# Patient Record
(demographics unavailable — no encounter records)

---

## 2024-12-19 NOTE — REVIEW OF SYSTEMS
[Negative] : Heme/Lymph [de-identified] : Left foot cyst along the extensor hallucis longus tendon, mobile.  Right hallux and second digit nail discoloration, subungual hematoma. [de-identified] : diminished light touch sensation bilaterally

## 2024-12-19 NOTE — HISTORY OF PRESENT ILLNESS
[FreeTextEntry1] : Pt is a 67 yr old F with a pmHx of osteoporosis, HLD, and a degree of peripheral neuropathy is being seen today for an evaluation of both feet for R hammer toe deformity and a possible cyst on the dorsum of the L foot. Pt reports she developed a blister on the medial aspect of the R 2nd toe around thanksgiving which has since resolved but would like to an assessment by podiatry to determine the exact etiology of the blister as she believes a maybe be do to a hammer toe deformity. If so, she would like to discuss conservative prevention strategies to prevent further deformity. The R hallux nail ecchymosis, pt reports she has had this in past and the nail eventually grows out and returns to normal. This pt doesn't not recall any traumatic events that may have caused this.

## 2024-12-19 NOTE — ASSESSMENT
[Verbal] : Verbal [Written] : Written [Demo] : Demo [Patient] : Patient [Good - alert, interested, motivated] : Good - alert, interested, motivated [Demonstrates independently] : demonstrates independently [Foot Care] : foot care [Skin Care] : skin care [Signs and symptoms of infection] : sign and symptoms of infection [Nutrition] : nutrition [How and When to Call] : how and when to call [Labs and Tests] : labs and tests [Patient responsibility to plan of care] : patient responsibility to plan of care [Stable] : stable [Home] : Home [Ambulatory] : Ambulatory [Not Applicable - Long Term Care/Home Health Agency] : Long Term Care/Home Health Agency: Not Applicable [] : Yes [FreeTextEntry2] : infection prevention foot care appropriate foot wear/toe spacers skin care [FreeTextEntry3] : initial visit  [FreeTextEntry4] : F/U PRN DPM discussed to monitor cyst on the L foot, no surgical intervention needed at this time DPM discussed the R hallux microtrauma as the cause of the nail discoloration.  DPM discussed use of toe spacers to prevent/reduce further hallux lateral deviation into the 2nd toe.

## 2024-12-19 NOTE — PHYSICAL EXAM
[2+] : left 2+ [Ankle Swelling (On Exam)] : not present [Varicose Veins Of Lower Extremities] : not present [] : not present [Skin Ulcer] : no ulcer [de-identified] : A&Ox3, NAD [de-identified] : Right hallux abutting the second toe [de-identified] : Left foot cyst along the extensor hallucis longus tendon, mobile.  Right hallux and second digit nail discoloration, subungual hematoma. [de-identified] : Light touch sensation diminished bilaterally [FreeTextEntry1] : R hallux nail bed- ecchymosis  [de-identified] : pulses palpable bilaterally  Left Dorsalis Pedis 2+ regular  Left Posterior Tibialis: 2+ regular  Right Dorsalis Pedis: 2+ regular  Right Posterior Tibialis: 2+ regular Cap refill <3 secs skin: warm/pigmented Vascular Studies: Not ordered by Dpm [FreeTextEntry7] : Left dorsal foot 1st metatarsal- cyst  [TWNoteComboBox4] : None [TWNoteComboBox5] : No [de-identified] : No [de-identified] : Normal [de-identified] : None [de-identified] : None [de-identified] : None [de-identified] : No [de-identified] : None [de-identified] : No

## 2024-12-19 NOTE — PLAN
[FreeTextEntry1] : Patient examined and evaluated this time.  Discussed possible etiology of her symptoms and all questions answered to satisfaction.  Discussed monitoring left foot dorsal cyst for any changes, will consider intervention if changes are noted.  Patient advised regarding possible microtrauma of the right hallux and second toenail, patient to monitor for any changes.  Patient to utilize toe spacers or bunion splint for the right foot at this time.  Spent 30 minutes in patient care and medical decision making.  Patient to follow-up as needed.

## 2025-01-08 NOTE — HISTORY OF PRESENT ILLNESS
[de-identified] : 67F presents for follow up of osteoporosis. Reports numbness in right hand and feet. Is currently following with Dr Stallworth. Has been taking alendronate without symptoms.

## 2025-01-08 NOTE — ASSESSMENT
[FreeTextEntry1] : umbilical hernia: reducible. She know signs of incarceration/strangulation of hernia. She will consider surgery if it gets bigger.  Osteoporosis: Continue alendronate 70mg weekly. Check DEXA  in April.

## 2025-01-08 NOTE — PHYSICAL EXAM
[No Acute Distress] : no acute distress [Normal Sclera/Conjunctiva] : normal sclera/conjunctiva [PERRL] : pupils equal round and reactive to light [EOMI] : extraocular movements intact [Soft] : abdomen soft [Non-distended] : non-distended [Normal Bowel Sounds] : normal bowel sounds [de-identified] : small reducible umbilical hernia

## 2025-03-06 NOTE — DISCUSSION/SUMMARY
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  Her degree of exertional dyspnea is at baseline.  Her echocardiogram showed normal LV systolic function, and her exercise stress test was negative for ischemia.  Her LDL was 120, and I sent her for a calcium score, and her score was at the 76th percentile.  She was started on low dose rosuvastatin, and her LDL is now less than 80.  She is seeing CT surgery and pulmonary in follow up for an incidentally found lung nodule on the calcium score.  She will continue rosuvastatin 5 QD.  We discussed the benefits of a healthy diet, regular exercise and physical activity, and weight loss in detail.  She will follow annually.  [EKG obtained to assist in diagnosis and management of assessed problem(s)] : EKG obtained to assist in diagnosis and management of assessed problem(s)

## 2025-03-06 NOTE — HISTORY OF PRESENT ILLNESS
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  Her degree of exertional dyspnea is at baseline.  Her echocardiogram showed normal LV systolic function, and her exercise stress test was negative for ischemia.  Her LDL was 120, and I sent her for a calcium score, and her score was at the 76th percentile.  She was started on low dose rosuvastatin, and her LDL is now less than 80.  She is seeing CT surgery and pulmonary in follow up for an incidentally found lung nodule on the calcium score.   She denies chest pains, PND, orthopnea, LE swelling, dizziness, or syncope.

## 2025-05-05 NOTE — CONSULT LETTER
[Dear  ___] : Dear  [unfilled], [Consult Letter:] : I had the pleasure of evaluating your patient, [unfilled]. [Please see my note below.] : Please see my note below. [Consult Closing:] : Thank you very much for allowing me to participate in the care of this patient.  If you have any questions, please do not hesitate to contact me. [Sincerely,] : Sincerely, [FreeTextEntry3] : Malik Lucero M.D. Surgery of the Hand & Upper Extremity Orthopaedic Surgery Chief, Hand Service, [facility]  of Orthopedic Surgery, [employer] at hospitals/[facility]Email: [email or web address] Office Phone: [phone]

## 2025-05-05 NOTE — DISCUSSION/SUMMARY
[FreeTextEntry1] :  She has findings consistent with a right ring finger trigger finger.  I had a discussion with the patient regarding today's visit, the prognosis of this diagnosis, and treatment recommendations and options. At this time, I recommended a cortisone injection at the right ring finger trigger finger. She agreed to proceed. She will follow-up in 4 weeks if needed.   The patient has agreed to the above plan of management and has expressed full understanding.  All questions were fully answered to the patient's satisfaction.  My cumulative time spent on this visit included: Preparation for the visit, review of the medical records, review of pertinent diagnostic studies, examination and counseling of the patient on the above diagnosis, treatment plan and prognosis, orders of diagnostic tests, medication and/or appropriate procedures and documentation in the medical records of today's visit.

## 2025-05-05 NOTE — HISTORY OF PRESENT ILLNESS
[Right] : right hand dominant [FreeTextEntry1] : She comes in today for evaluation of right ring finger pain that has been ongoing for about 6 months with no known injury. She reports swelling, clicking, locking, and stiffness.  She states that she has had triggering for a number of years but only pain for the past 6 months.  She rates her pain as a 3/10. She denies numbness, tingling, or radiating pain.   She has neuropathy of her legs due to unknown causes.   She was referred by Dr. Hudson.

## 2025-05-05 NOTE — END OF VISIT
[FreeTextEntry3] : This note was written by Nj Jung on 05/05/2025 acting solely as a scribe for Dr. Malik Lucero.   All medical record entries made by the Scribe were at my, Dr. Malik Lucero, direction and personally dictated by me on 05/05/2025. I have personally reviewed the chart and agree that the record accurately reflects my personal performance of the history, physical exam, assessment and plan.

## 2025-05-05 NOTE — PHYSICAL EXAM
[de-identified] : - Constitutional: This is a female in no obvious distress.   - Psych: Patient is alert and oriented to person, place and time.  Patient has a normal mood and affect.  - Cardiovascular: Normal pulses throughout the upper extremities.  No significant varicosities are noted in the upper extremities.   ---   Examination of her right ring finger demonstrates mild swelling and tenderness along the A1 pulley of the ring finger.  There is triggering.  There is no triggering of the other digits.  She is neurovascularly intact distally.
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
## 2025-05-15 NOTE — REVIEW OF SYSTEMS
[Sneezing] : sneezing [Seasonal Allergies] : seasonal allergies [Post Nasal Drip] : post nasal drip [As Noted in HPI] : as noted in HPI [Ear Noises] : ear noises [Negative] : Heme/Lymph [FreeTextEntry1] : hives

## 2025-06-10 NOTE — PHYSICAL EXAM
[No Acute Distress] : no acute distress [Normal Sclera/Conjunctiva] : normal sclera/conjunctiva [PERRL] : pupils equal round and reactive to light [EOMI] : extraocular movements intact [Normal Oropharynx] : the oropharynx was normal [Normal TMs] : both tympanic membranes were normal [No Lymphadenopathy] : no lymphadenopathy [Supple] : supple [Thyroid Normal, No Nodules] : the thyroid was normal and there were no nodules present [No Respiratory Distress] : no respiratory distress  [No Accessory Muscle Use] : no accessory muscle use [Clear to Auscultation] : lungs were clear to auscultation bilaterally [Normal Rate] : normal rate  [Regular Rhythm] : with a regular rhythm [Normal S1, S2] : normal S1 and S2 [No Murmur] : no murmur heard [No Edema] : there was no peripheral edema [Soft] : abdomen soft [Non Tender] : non-tender [Non-distended] : non-distended [Normal Bowel Sounds] : normal bowel sounds [Normal Posterior Cervical Nodes] : no posterior cervical lymphadenopathy [Normal Anterior Cervical Nodes] : no anterior cervical lymphadenopathy [No Spinal Tenderness] : no spinal tenderness [Grossly Normal Strength/Tone] : grossly normal strength/tone [No Focal Deficits] : no focal deficits [Normal Gait] : normal gait [Deep Tendon Reflexes (DTR)] : deep tendon reflexes were 2+ and symmetric [Normal Insight/Judgement] : insight and judgment were intact [Normal Affect] : the affect was normal

## 2025-06-10 NOTE — ASSESSMENT
[FreeTextEntry1] : osteoporosis: On alendronate. Discussed DEXA next year.  HLD: Check lipids. On rosuvastatin.  hashimoto's: Check TFTs.  Lung nodules: She follows with Dr Rodriguez. Has CT chest pending.  HCM: Up to date on colonoscopy. Discussed mammogram for breast ca screening. Up to date on shingrix, prevnar. Check labs. She sees derm every 6 months.   [Vaccines Reviewed] : Immunizations reviewed today. Please see immunization details in the vaccine log within the immunization flowsheet.

## 2025-06-10 NOTE — HEALTH RISK ASSESSMENT
[Good] : ~his/her~  mood as  good [No] : In the past 12 months have you used drugs other than those required for medical reasons? No [No falls in past year] : Patient reported no falls in the past year [0] : 2) Feeling down, depressed, or hopeless: Not at all (0) [PHQ-2 Negative - No further assessment needed] : PHQ-2 Negative - No further assessment needed [[facility]] : 5 [[id number]] : 0 [Never] : Never [Change in mental status noted] : No change in mental status noted [Language] : denies difficulty with language [Behavior] : denies difficulty with behavior [Learning/Retaining New Information] : denies difficulty learning/retaining new information [Handling Complex Tasks] : denies difficulty handling complex tasks [Reasoning] : denies difficulty with reasoning [Spatial Ability and Orientation] : denies difficulty with spatial ability and orientation [Alone] : lives alone [Fully functional (bathing, dressing, toileting, transferring, walking, feeding)] : Fully functional (bathing, dressing, toileting, transferring, walking, feeding) [Fully functional (using the telephone, shopping, preparing meals, housekeeping, doing laundry, using] : Fully functional and needs no help or supervision to perform IADLs (using the telephone, shopping, preparing meals, housekeeping, doing laundry, using transportation, managing medications and managing finances) [Reports changes in hearing] : Reports no changes in hearing [Reports changes in vision] : Reports no changes in vision [Reports normal functional visual acuity (ie: able to read med bottle)] : Reports normal functional visual acuity [Smoke Detector] : smoke detector [Carbon Monoxide Detector] : carbon monoxide detector [Seat Belt] :  uses seat belt [With Patient/Caregiver] : , with patient/caregiver [Name: ___] : Health Care Proxy's Name: [unfilled]  [Relationship: ___] : Relationship: [unfilled] [AdvancecareDate] : 06/25

## 2025-06-25 NOTE — ASSESSMENT
[FreeTextEntry1] : A complete skin examination was performed.  There is no evidence of skin cancer.  We discussed the importance of photoprotection, including the use of hats, protective clothing and sunscreens with an SPF of at least 30.  Sun avoidance was also discussed.  The ABCDE's of melanoma was discussed.  Regular skin exams recommended.  Given positive GAVI and DS DNA Ab, and this strange symptom of numbness, blanching and blue color of the pinky at relatively warm temps, recommend f/u with Rheum.

## 2025-06-25 NOTE — PHYSICAL EXAM
[Alert] : alert [Oriented x 3] : ~L oriented x 3 [Well Nourished] : well nourished [Full Body Skin Exam Performed] : performed [FreeTextEntry3] : A full skin exam was performed including the scalp, face, neck, chest, abdomen, back, buttocks, upper extremities and lower extremities.  The patient declined examination of the breasts and genitalia.   The exam did show the following benign growths: [place] pigmented nevi. Blue macules, 1.5-2 mm each - right frontal hairline and left upper arm. Lentigines. Hyperpigmented erythematous barely elevated papule with positive dimple sign, c/w a dermatofibroma on the left anterior thigh.

## 2025-06-25 NOTE — HISTORY OF PRESENT ILLNESS
[FreeTextEntry1] : Patient presents for skin examination.  [de-identified] : Denies new, changing, bleeding or tender lesions on the skin over the past year. Hx of the a "Raynaud's flare" on the right pinky recently, with outdoor exposure at 60 F. Hx of GAVI and DS DNA Ab positive.